# Patient Record
Sex: MALE
[De-identification: names, ages, dates, MRNs, and addresses within clinical notes are randomized per-mention and may not be internally consistent; named-entity substitution may affect disease eponyms.]

---

## 2017-08-14 ENCOUNTER — APPOINTMENT (OUTPATIENT)
Dept: INTERNAL MEDICINE | Facility: CLINIC | Age: 62
End: 2017-08-14
Payer: COMMERCIAL

## 2017-08-14 PROCEDURE — 93000 ELECTROCARDIOGRAM COMPLETE: CPT

## 2017-08-14 PROCEDURE — 99396 PREV VISIT EST AGE 40-64: CPT | Mod: 25

## 2017-10-30 ENCOUNTER — APPOINTMENT (OUTPATIENT)
Dept: INTERNAL MEDICINE | Facility: CLINIC | Age: 62
End: 2017-10-30
Payer: COMMERCIAL

## 2017-10-30 PROCEDURE — 99213 OFFICE O/P EST LOW 20 MIN: CPT

## 2019-03-25 ENCOUNTER — APPOINTMENT (OUTPATIENT)
Dept: INTERNAL MEDICINE | Facility: CLINIC | Age: 64
End: 2019-03-25
Payer: COMMERCIAL

## 2019-03-25 VITALS
OXYGEN SATURATION: 99 % | TEMPERATURE: 97.9 F | BODY MASS INDEX: 23.49 KG/M2 | HEIGHT: 68 IN | HEART RATE: 53 BPM | DIASTOLIC BLOOD PRESSURE: 78 MMHG | RESPIRATION RATE: 12 BRPM | SYSTOLIC BLOOD PRESSURE: 143 MMHG | WEIGHT: 155 LBS

## 2019-03-25 DIAGNOSIS — Z83.3 FAMILY HISTORY OF DIABETES MELLITUS: ICD-10-CM

## 2019-03-25 DIAGNOSIS — Z87.09 PERSONAL HISTORY OF OTHER DISEASES OF THE RESPIRATORY SYSTEM: ICD-10-CM

## 2019-03-25 DIAGNOSIS — Z78.9 OTHER SPECIFIED HEALTH STATUS: ICD-10-CM

## 2019-03-25 DIAGNOSIS — Z80.8 FAMILY HISTORY OF MALIGNANT NEOPLASM OF OTHER ORGANS OR SYSTEMS: ICD-10-CM

## 2019-03-25 DIAGNOSIS — Z91.89 OTHER SPECIFIED PERSONAL RISK FACTORS, NOT ELSEWHERE CLASSIFIED: ICD-10-CM

## 2019-03-25 DIAGNOSIS — Z87.19 PERSONAL HISTORY OF OTHER DISEASES OF THE DIGESTIVE SYSTEM: ICD-10-CM

## 2019-03-25 DIAGNOSIS — Z81.8 FAMILY HISTORY OF OTHER MENTAL AND BEHAVIORAL DISORDERS: ICD-10-CM

## 2019-03-25 DIAGNOSIS — Z86.39 PERSONAL HISTORY OF OTHER ENDOCRINE, NUTRITIONAL AND METABOLIC DISEASE: ICD-10-CM

## 2019-03-25 PROCEDURE — 99213 OFFICE O/P EST LOW 20 MIN: CPT

## 2019-03-25 NOTE — PHYSICAL EXAM
[de-identified] : MILDLY TENDER LEFT BREAST TISSSUE NO NODULES OR MASS FELT NO NIPPLE RETRACTION NL SKIN

## 2019-03-25 NOTE — HISTORY OF PRESENT ILLNESS
[FreeTextEntry8] : PT COMES WITH CC OF TENDER/SORE LEFT PEC AREA FOR A FEW WEEKS ,NO TRAUMA OR TRIGGER,AT TIMES WORSE IF HE TAKES A DEEP BREATH OR COUGHS

## 2019-03-25 NOTE — REVIEW OF SYSTEMS
[Mole Changes] : mole changes [Negative] : Heme/Lymph [FreeTextEntry9] : RIB CAGE PAIN [de-identified] : ? ENLARGED LEFT BREAST TISSUE

## 2019-03-25 NOTE — ASSESSMENT
[FreeTextEntry1] : ACUTE VISIT OF 63 Y OLD MALE WHO PRESENTS WITH LEFT RIB CAGE/LEFT BREAST AREA TENDERNESS FOR 1 M WITH NNO TRIGGER OR CAUSE= XR LEFT RIB CAGE AND MAMMOGRAM ORDERED= ;LABS ORDERED RTO FOR CPE

## 2019-04-17 ENCOUNTER — NON-APPOINTMENT (OUTPATIENT)
Age: 64
End: 2019-04-17

## 2019-04-17 ENCOUNTER — APPOINTMENT (OUTPATIENT)
Dept: INTERNAL MEDICINE | Facility: CLINIC | Age: 64
End: 2019-04-17
Payer: COMMERCIAL

## 2019-04-17 VITALS
DIASTOLIC BLOOD PRESSURE: 70 MMHG | HEART RATE: 59 BPM | SYSTOLIC BLOOD PRESSURE: 113 MMHG | TEMPERATURE: 97.8 F | WEIGHT: 165 LBS | OXYGEN SATURATION: 97 % | BODY MASS INDEX: 25.01 KG/M2 | RESPIRATION RATE: 12 BRPM | HEIGHT: 68 IN

## 2019-04-17 DIAGNOSIS — N62 HYPERTROPHY OF BREAST: ICD-10-CM

## 2019-04-17 PROCEDURE — 93000 ELECTROCARDIOGRAM COMPLETE: CPT

## 2019-04-17 PROCEDURE — 99396 PREV VISIT EST AGE 40-64: CPT | Mod: 25

## 2019-04-17 RX ORDER — MULTIVIT-MIN/FOLIC/VIT K/LYCOP 400-300MCG
50 MCG TABLET ORAL
Qty: 180 | Refills: 1 | Status: ACTIVE | COMMUNITY
Start: 2019-04-17 | End: 1900-01-01

## 2019-04-17 NOTE — PHYSICAL EXAM
[No Acute Distress] : no acute distress [Well Developed] : well developed [Well Nourished] : well nourished [Well-Appearing] : well-appearing [Normal Sclera/Conjunctiva] : normal sclera/conjunctiva [EOMI] : extraocular movements intact [PERRL] : pupils equal round and reactive to light [Normal Outer Ear/Nose] : the outer ears and nose were normal in appearance [Normal Oropharynx] : the oropharynx was normal [No JVD] : no jugular venous distention [Supple] : supple [No Lymphadenopathy] : no lymphadenopathy [Thyroid Normal, No Nodules] : the thyroid was normal and there were no nodules present [No Respiratory Distress] : no respiratory distress  [Clear to Auscultation] : lungs were clear to auscultation bilaterally [No Accessory Muscle Use] : no accessory muscle use [Normal Rate] : normal rate  [Regular Rhythm] : with a regular rhythm [Normal S1, S2] : normal S1 and S2 [No Carotid Bruits] : no carotid bruits [No Murmur] : no murmur heard [No Abdominal Bruit] : a ~M bruit was not heard ~T in the abdomen [No Varicosities] : no varicosities [Pedal Pulses Present] : the pedal pulses are present [No Palpable Aorta] : no palpable aorta [No Extremity Clubbing/Cyanosis] : no extremity clubbing/cyanosis [No Edema] : there was no peripheral edema [Soft] : abdomen soft [Non Tender] : non-tender [Non-distended] : non-distended [No HSM] : no HSM [No Masses] : no abdominal mass palpated [Normal Bowel Sounds] : normal bowel sounds [Normal Posterior Cervical Nodes] : no posterior cervical lymphadenopathy [Normal Anterior Cervical Nodes] : no anterior cervical lymphadenopathy [No CVA Tenderness] : no CVA  tenderness [No Spinal Tenderness] : no spinal tenderness [No Joint Swelling] : no joint swelling [Grossly Normal Strength/Tone] : grossly normal strength/tone [No Rash] : no rash [Normal Gait] : normal gait [Coordination Grossly Intact] : coordination grossly intact [No Focal Deficits] : no focal deficits [Deep Tendon Reflexes (DTR)] : deep tendon reflexes were 2+ and symmetric [Normal Affect] : the affect was normal [Normal Insight/Judgement] : insight and judgment were intact

## 2019-04-17 NOTE — ASSESSMENT
[FreeTextEntry1] : CPE OF 63 Y OLD MALE WITH PMX OF VIT D DEF= RESUME VIT D 2000 IU DAILY\par CHRISTINE GYNECOMASTIA= TO SEE ENDO FOR FURTHER RECOMM\par RTO IN 1 Y OR PRN

## 2020-01-29 ENCOUNTER — APPOINTMENT (OUTPATIENT)
Dept: INTERNAL MEDICINE | Facility: CLINIC | Age: 65
End: 2020-01-29
Payer: COMMERCIAL

## 2020-01-29 VITALS
BODY MASS INDEX: 24.25 KG/M2 | HEIGHT: 68 IN | HEART RATE: 58 BPM | RESPIRATION RATE: 14 BRPM | WEIGHT: 160 LBS | DIASTOLIC BLOOD PRESSURE: 77 MMHG | TEMPERATURE: 98.1 F | OXYGEN SATURATION: 98 % | SYSTOLIC BLOOD PRESSURE: 135 MMHG

## 2020-01-29 DIAGNOSIS — R26.81 UNSTEADINESS ON FEET: ICD-10-CM

## 2020-01-29 DIAGNOSIS — R42 DIZZINESS AND GIDDINESS: ICD-10-CM

## 2020-01-29 PROCEDURE — 99214 OFFICE O/P EST MOD 30 MIN: CPT

## 2020-01-29 NOTE — PHYSICAL EXAM
[No Acute Distress] : no acute distress [Well Nourished] : well nourished [Well Developed] : well developed [Well-Appearing] : well-appearing [Normal Sclera/Conjunctiva] : normal sclera/conjunctiva [PERRL] : pupils equal round and reactive to light [Normal Outer Ear/Nose] : the outer ears and nose were normal in appearance [EOMI] : extraocular movements intact [Normal Oropharynx] : the oropharynx was normal [No JVD] : no jugular venous distention [No Lymphadenopathy] : no lymphadenopathy [No Respiratory Distress] : no respiratory distress  [Supple] : supple [Thyroid Normal, No Nodules] : the thyroid was normal and there were no nodules present [No Accessory Muscle Use] : no accessory muscle use [Clear to Auscultation] : lungs were clear to auscultation bilaterally [Normal S1, S2] : normal S1 and S2 [Normal Rate] : normal rate  [Regular Rhythm] : with a regular rhythm [No Murmur] : no murmur heard [No Carotid Bruits] : no carotid bruits [No Abdominal Bruit] : a ~M bruit was not heard ~T in the abdomen [No Varicosities] : no varicosities [No Edema] : there was no peripheral edema [Pedal Pulses Present] : the pedal pulses are present [No Extremity Clubbing/Cyanosis] : no extremity clubbing/cyanosis [Soft] : abdomen soft [No Palpable Aorta] : no palpable aorta [Non Tender] : non-tender [Non-distended] : non-distended [No Masses] : no abdominal mass palpated [Normal Bowel Sounds] : normal bowel sounds [No HSM] : no HSM [Normal Posterior Cervical Nodes] : no posterior cervical lymphadenopathy [Normal Anterior Cervical Nodes] : no anterior cervical lymphadenopathy [No CVA Tenderness] : no CVA  tenderness [No Joint Swelling] : no joint swelling [Grossly Normal Strength/Tone] : grossly normal strength/tone [No Spinal Tenderness] : no spinal tenderness [No Rash] : no rash [Coordination Grossly Intact] : coordination grossly intact [No Focal Deficits] : no focal deficits [Deep Tendon Reflexes (DTR)] : deep tendon reflexes were 2+ and symmetric [Normal] : the sensory exam was normal [Limited Balance] : the patient's balance was impaired [Romberg's Sign] : Romberg's sign was negtive [Coordination - Dysmetria Impaired Finger-to-Nose Bilateral] : not present [Past-pointing] : there was no past-pointing [Dysdiadochokinesia Bilaterally] : not present [Normal Affect] : the affect was normal [Normal Insight/Judgement] : insight and judgment were intact

## 2020-01-29 NOTE — COUNSELING
[Fall prevention counseling provided] : Fall prevention counseling provided [Use recommended devices] : Use recommended devices [Use proper foot wear] : Use proper foot wear

## 2020-01-29 NOTE — ASSESSMENT
[FreeTextEntry1] : ACUTE VISIT OF 64 Y OLD MALE WITH VERTIGO AND PROGRESSIVE GAIT DISTURBANCE= LABS ORDERED,REFER TO SEE NEUROLOGY

## 2020-01-29 NOTE — HISTORY OF PRESENT ILLNESS
[FreeTextEntry8] : PT PRESENT WITH OVER 6 M OF GAIT DISTURBANCE EVERY DAY AND PROGRESSING ,USING A CANE TO AVOID FALLING ,HAS NOT FALLEN  BUT AFRAID OF DOING SO ,SPECIALLY WHEN CROSSING THE STREET\par LEFT FOOT PAIN AND FEELS LLE IS WEAKER THAN THE RIGHT \par HAS TO MOVE APARTMENT IN THE SUMMER AND FEELS TAHT FROM THERE ON SX GOT WORSE

## 2020-06-29 ENCOUNTER — APPOINTMENT (OUTPATIENT)
Dept: INTERNAL MEDICINE | Facility: CLINIC | Age: 65
End: 2020-06-29
Payer: COMMERCIAL

## 2020-06-29 VITALS
HEIGHT: 68 IN | WEIGHT: 159 LBS | HEART RATE: 63 BPM | BODY MASS INDEX: 24.1 KG/M2 | DIASTOLIC BLOOD PRESSURE: 75 MMHG | SYSTOLIC BLOOD PRESSURE: 144 MMHG | TEMPERATURE: 98.5 F | RESPIRATION RATE: 14 BRPM | OXYGEN SATURATION: 98 %

## 2020-06-29 DIAGNOSIS — G95.89 OTHER SPECIFIED DISEASES OF SPINAL CORD: ICD-10-CM

## 2020-06-29 DIAGNOSIS — Z01.818 ENCOUNTER FOR OTHER PREPROCEDURAL EXAMINATION: ICD-10-CM

## 2020-06-29 PROCEDURE — 99215 OFFICE O/P EST HI 40 MIN: CPT

## 2020-06-29 PROCEDURE — 93000 ELECTROCARDIOGRAM COMPLETE: CPT | Mod: NC

## 2020-06-29 NOTE — ASSESSMENT
[Patient Optimized for Surgery] : Patient optimized for surgery [Other: _____] : [unfilled] [As per surgery] : as per surgery [FreeTextEntry4] : 64 Y OLD MALE WITH MYELOMALACIA OF CERVICAL SPINAL CORD AT ACCEPTABLE RISK FOR SURGERY

## 2020-06-29 NOTE — REVIEW OF SYSTEMS
[Muscle Pain] : muscle pain [Muscle Weakness] : muscle weakness [Back Pain] : back pain [Unsteady Walk] : ataxia [Negative] : Heme/Lymph [de-identified] : PARESTHESIAS LEFT UPPER AND LOWER EXTR

## 2020-06-29 NOTE — HISTORY OF PRESENT ILLNESS
[No Pertinent Cardiac History] : no history of aortic stenosis, atrial fibrillation, coronary artery disease, recent myocardial infarction, or implantable device/pacemaker [No Pertinent Pulmonary History] : no history of asthma, COPD, sleep apnea, or smoking [No Adverse Anesthesia Reaction] : no adverse anesthesia reaction in self or family member [Chronic Kidney Disease] : no chronic kidney disease [Chronic Anticoagulation] : no chronic anticoagulation [FreeTextEntry1] : C4-7 LAMINECTOMY [Diabetes] : no diabetes [FreeTextEntry2] : 7/6/2020 [FreeTextEntry3] : DR OMAYRA NOYOLA

## 2020-06-29 NOTE — PHYSICAL EXAM
[Well Nourished] : well nourished [Well Developed] : well developed [No Acute Distress] : no acute distress [Well-Appearing] : well-appearing [Normal Sclera/Conjunctiva] : normal sclera/conjunctiva [PERRL] : pupils equal round and reactive to light [EOMI] : extraocular movements intact [No Lymphadenopathy] : no lymphadenopathy [No JVD] : no jugular venous distention [Thyroid Normal, No Nodules] : the thyroid was normal and there were no nodules present [Supple] : supple [No Respiratory Distress] : no respiratory distress  [Clear to Auscultation] : lungs were clear to auscultation bilaterally [No Accessory Muscle Use] : no accessory muscle use [Regular Rhythm] : with a regular rhythm [Normal Rate] : normal rate  [Normal S1, S2] : normal S1 and S2 [No Murmur] : no murmur heard [No Carotid Bruits] : no carotid bruits [No Varicosities] : no varicosities [No Abdominal Bruit] : a ~M bruit was not heard ~T in the abdomen [No Edema] : there was no peripheral edema [Pedal Pulses Present] : the pedal pulses are present [No Extremity Clubbing/Cyanosis] : no extremity clubbing/cyanosis [No Palpable Aorta] : no palpable aorta [Soft] : abdomen soft [Non Tender] : non-tender [No Masses] : no abdominal mass palpated [Non-distended] : non-distended [Normal Bowel Sounds] : normal bowel sounds [No HSM] : no HSM [Normal Posterior Cervical Nodes] : no posterior cervical lymphadenopathy [Normal Anterior Cervical Nodes] : no anterior cervical lymphadenopathy [No Spinal Tenderness] : no spinal tenderness [No CVA Tenderness] : no CVA  tenderness [No Joint Swelling] : no joint swelling [No Rash] : no rash [Grossly Normal Strength/Tone] : grossly normal strength/tone [Coordination Grossly Intact] : coordination grossly intact [No Focal Deficits] : no focal deficits [Deep Tendon Reflexes (DTR)] : deep tendon reflexes were 2+ and symmetric [Normal Affect] : the affect was normal [Normal Insight/Judgement] : insight and judgment were intact [de-identified] : MILD LUE WEAKNESS [de-identified] : WALSK WITH CANE

## 2020-09-16 ENCOUNTER — APPOINTMENT (OUTPATIENT)
Dept: INTERNAL MEDICINE | Facility: CLINIC | Age: 65
End: 2020-09-16
Payer: COMMERCIAL

## 2020-09-16 VITALS
WEIGHT: 162 LBS | BODY MASS INDEX: 24.55 KG/M2 | HEART RATE: 55 BPM | RESPIRATION RATE: 14 BRPM | HEIGHT: 68 IN | TEMPERATURE: 96.9 F | OXYGEN SATURATION: 99 % | DIASTOLIC BLOOD PRESSURE: 75 MMHG | SYSTOLIC BLOOD PRESSURE: 122 MMHG

## 2020-09-16 PROCEDURE — 99214 OFFICE O/P EST MOD 30 MIN: CPT

## 2020-09-16 NOTE — HISTORY OF PRESENT ILLNESS
[FreeTextEntry8] : PT WALKS INTO OFFICE WITH  CC OF TENDER BULGING ON R INGUINAL AREA  FOR SEVERAL DAYS,WORSE AFTER AN EPISODE OF CONSTIPATION\par ALSO CC OF EPIGASTRIC POSTPRANDIAL DYSPEPSIA FOR A FEW DAYS \par NO  SX NORMAL APPETITE ,NO FEVER ,NAUSEA OR ANY OTHER SX

## 2020-09-16 NOTE — PHYSICAL EXAM
[No Respiratory Distress] : no respiratory distress  [Soft, Nontender] : the abdomen was soft and nontender [Normal Rate] : normal rate  [Normal] : normal [Periumbilical] : not periumbilical [Epigastric] : in the epigastric area [Suprapubic] : not in the suprapubic area [Firm] : not firm [Rigid] : not rigid [Rebound] : no rebound [Guarding] : no guarding [No Mass] : no masses were palpated [Inguinal Hernia Right] : a right inguinal hernia was present [] : which was reducible [No HSM] : no hepatosplenomegaly noted [None] : no CVA tenderness

## 2020-09-16 NOTE — ASSESSMENT
[FreeTextEntry1] : ACUTE VISIT OF 65 Y OLD MALE WHO PRESENTS WITH NEW ONSET OF DYSPEPSIA -HEARTBURN FOR A FEW DAYS= OMEPRAZOLE 20 MG DAILY\par NEWLY DX R INGUINAL HERNIA=,SMALL AND REDUCIBLE= TO SEE SURGEON ;EXPLAIN NEED TO GO TO ER IF STEADY PAIN OR ANY OTHER GI SX DEVELOPS

## 2020-09-16 NOTE — REVIEW OF SYSTEMS
[Abdominal Pain] : abdominal pain [Negative] : Psychiatric [FreeTextEntry7] : SEE HPI [FreeTextEntry8] : SEE HPI

## 2020-09-25 ENCOUNTER — NON-APPOINTMENT (OUTPATIENT)
Age: 65
End: 2020-09-25

## 2020-09-25 ENCOUNTER — APPOINTMENT (OUTPATIENT)
Dept: INTERNAL MEDICINE | Facility: CLINIC | Age: 65
End: 2020-09-25
Payer: COMMERCIAL

## 2020-09-25 VITALS
HEIGHT: 68 IN | HEART RATE: 57 BPM | WEIGHT: 161 LBS | OXYGEN SATURATION: 98 % | DIASTOLIC BLOOD PRESSURE: 81 MMHG | BODY MASS INDEX: 24.4 KG/M2 | RESPIRATION RATE: 15 BRPM | TEMPERATURE: 97.1 F | SYSTOLIC BLOOD PRESSURE: 136 MMHG

## 2020-09-25 DIAGNOSIS — R25.1 TREMOR, UNSPECIFIED: ICD-10-CM

## 2020-09-25 DIAGNOSIS — R00.2 PALPITATIONS: ICD-10-CM

## 2020-09-25 PROCEDURE — 93000 ELECTROCARDIOGRAM COMPLETE: CPT

## 2020-09-25 PROCEDURE — 99214 OFFICE O/P EST MOD 30 MIN: CPT

## 2020-09-25 NOTE — PHYSICAL EXAM

## 2020-09-25 NOTE — HISTORY OF PRESENT ILLNESS
[FreeTextEntry8] : PT DEVELOPED LEFT ARM TREMORS FOLLOWED BY DIFFUSE TREMORS 4 NIGHTS AGO ,WHEN WIFE MEASURE HIS BP /130 AND PULSE OVER 130,AFTER 20 MIN BP AND PULSE RETURN TO NORMAL ;THE FOLLOWING NIGHT HAD SIMILAR EPISODE BUT SHORTER AND LESS INTENSE AND DID NOT MEASURE HIS PULSE OR BP\par LAST 2 NIGHTS NO SX \par NO SX DURING DAYTIME\par

## 2020-09-25 NOTE — ASSESSMENT
[FreeTextEntry1] : ACUTE VISIT OF 65 Y OLD MALE WHO PRESENTS WITH 2 EPISODES OF PALPITATIONS AND ELEVATED BP READING ALONG WITH UPPER EXTR TREMORS AT NIGHT ,SELF RESOLVED= EKG ,ECHO AND CARDIOLOGY EVAL\par R ING HERNIA = MAY PROCEED TO SURGERY AFTER CARDIOLOGY EVAL

## 2020-10-01 ENCOUNTER — APPOINTMENT (OUTPATIENT)
Dept: HEART AND VASCULAR | Facility: CLINIC | Age: 65
End: 2020-10-01
Payer: COMMERCIAL

## 2020-10-01 VITALS
HEART RATE: 65 BPM | SYSTOLIC BLOOD PRESSURE: 118 MMHG | TEMPERATURE: 97.2 F | HEIGHT: 68 IN | RESPIRATION RATE: 14 BRPM | WEIGHT: 161 LBS | BODY MASS INDEX: 24.4 KG/M2 | OXYGEN SATURATION: 99 % | DIASTOLIC BLOOD PRESSURE: 56 MMHG

## 2020-10-01 PROCEDURE — 99215 OFFICE O/P EST HI 40 MIN: CPT

## 2020-10-01 NOTE — ASSESSMENT
[FreeTextEntry1] : 66 yo M with PMH cervical stenosis here for first evaluation with this provider for pre op evaluation prior to hernia repair surgery\par \par PRE OP EVALUATION\par -The patient has good exercise tolerance without any angina\par -Baseline EKG is significant for sinus bradycardia, which is she is unchanged and in the last months, without evidence of ischemia\par -Baseline echocardiogram is within normal limits with a normal EF\par -The patient doesn't have a cardiac history and has minimal risk factors\par -In view of the above the patient can proceed to treat hernia repair surgery without the need of any additional cardiological workup\par -Recommended to maintain patient euvolemic normotensive during the procedure\par \par SINUS BRADYCARDIA\par -The patient has asymptomatic sinus bradycardia, normal echocardiogram and good exercise tolerance\par -In the setting of a lack of symptoms, recommend to continue to monitor without any further work up\par -Recommended to call clinic if onset of symptoms, we'll assess for chronotropic competence with a stress test\par \par HTN\par -Well controlled on no medications\par -Continue with healthy diet and exercise\par \par HLD\par -lipid panel 2/3/2020, cholesterol 201, LDL 96, HDL 88 on no medications\par \par \par f/u with PMD \par please do not hesitate to call if any questions

## 2020-10-01 NOTE — HISTORY OF PRESENT ILLNESS
[FreeTextEntry1] : 66 yo M with PMH cervical stenosis here for first evaluation with this provider \par  The patient is here for preoperative clearance prior to hernia repair.\par \par The patient reports being able to walk outdoor with a cane for more than 8 blocks without chest pain, sob, palpitations.  Reports episodes of pinpoint reproducible left rib cage pain which has been present for many years. (negative CXR in june 2020_ \par Denies h/o CVA. He is not taking any medications. \par Had a laminoplasty for cervical stenosis in July 2020 which was uneventful. \par \par \par PMH\par GERD \par asthma\par cervical stenosis\par \par PSH\par none\par \par ALL\par ibuprofen\par \par MEDS\par Vit D\par \par SH\par no smoke, social etoh, no drugs\par \par EKG 9/25/2020\par sinus bradycardia HR 53 (unchanged from 6/2020)\par \par Echocardiogram 9/28/2020\par Normal left ventricular size and function with an ejection fraction of 55-60%\par \par Labs 6/2020 \par creatinine 1.0\par

## 2020-10-01 NOTE — PHYSICAL EXAM
[FreeTextEntry1] : thin man,in nad  [Normal Jugular Venous A Waves Present] : normal jugular venous A waves present [Normal Jugular Venous V Waves Present] : normal jugular venous V waves present [No Jugular Venous Massey A Waves] : no jugular venous massey A waves [Heart Rate And Rhythm] : heart rate and rhythm were normal [Heart Sounds] : normal S1 and S2 [Murmurs] : no murmurs present [Respiration, Rhythm And Depth] : normal respiratory rhythm and effort [Exaggerated Use Of Accessory Muscles For Inspiration] : no accessory muscle use [Auscultation Breath Sounds / Voice Sounds] : lungs were clear to auscultation bilaterally [Abdomen Soft] : soft [Abdomen Tenderness] : non-tender [Abdomen Mass (___ Cm)] : no abdominal mass palpated [Nail Clubbing] : no clubbing of the fingernails [Cyanosis, Localized] : no localized cyanosis [Petechial Hemorrhages (___cm)] : no petechial hemorrhages [] : no ischemic changes

## 2020-10-05 ENCOUNTER — APPOINTMENT (OUTPATIENT)
Dept: INTERNAL MEDICINE | Facility: CLINIC | Age: 65
End: 2020-10-05
Payer: COMMERCIAL

## 2020-10-05 VITALS
WEIGHT: 162 LBS | TEMPERATURE: 98.9 F | DIASTOLIC BLOOD PRESSURE: 80 MMHG | RESPIRATION RATE: 14 BRPM | SYSTOLIC BLOOD PRESSURE: 136 MMHG | OXYGEN SATURATION: 95 % | HEIGHT: 68 IN | HEART RATE: 69 BPM | BODY MASS INDEX: 24.55 KG/M2

## 2020-10-05 DIAGNOSIS — M47.12 OTHER SPONDYLOSIS WITH MYELOPATHY, CERVICAL REGION: ICD-10-CM

## 2020-10-05 DIAGNOSIS — R07.89 OTHER CHEST PAIN: ICD-10-CM

## 2020-10-05 DIAGNOSIS — K21.9 GASTRO-ESOPHAGEAL REFLUX DISEASE W/OUT ESOPHAGITIS: ICD-10-CM

## 2020-10-05 DIAGNOSIS — M47.22 OTHER SPONDYLOSIS WITH MYELOPATHY, CERVICAL REGION: ICD-10-CM

## 2020-10-05 PROCEDURE — 99214 OFFICE O/P EST MOD 30 MIN: CPT

## 2020-10-05 NOTE — HISTORY OF PRESENT ILLNESS
[FreeTextEntry1] : CHEST PAIN [de-identified] : PT STILL WITH SPORADIC LEFT SIDE CHEST PAIN MID AXILLARY LINE ,WORSE WHEN TURNS IN BED ,NOT ASSOCIATED WITH ACTIVITY ,NO COUGH NO SOB ,NO TENDERNESS TO TOUCH \par SEEN BY CARDIOLOGY FEW DAYS AGO WITH NORMAL W/U\par PT UNDERGOING PT AFTER C SPINE SURGERY\par

## 2020-10-05 NOTE — ASSESSMENT
[FreeTextEntry1] : 65 Y OLD MALE WITH ATYPICAL CHEST PAIN ,MILD= OBSERVE\par GERD= ASYMPTOMATIC ,TAPER OMEPRAZOLE TO OFF AS TOLERATED \par S/P C SPINE SURGERY= ON PT \par R INGUINAL HERNIA= PT WILL WAIT TO HAVE SURGERY FOR NOW\par ABLE TO RETURN TO WORK 10-7-2020

## 2020-10-05 NOTE — PHYSICAL EXAM
[No Acute Distress] : no acute distress [Well Nourished] : well nourished [Well Developed] : well developed [Well-Appearing] : well-appearing [Normal Sclera/Conjunctiva] : normal sclera/conjunctiva [PERRL] : pupils equal round and reactive to light [EOMI] : extraocular movements intact [No JVD] : no jugular venous distention [No Lymphadenopathy] : no lymphadenopathy [Supple] : supple [Thyroid Normal, No Nodules] : the thyroid was normal and there were no nodules present [No Respiratory Distress] : no respiratory distress  [No Accessory Muscle Use] : no accessory muscle use [Clear to Auscultation] : lungs were clear to auscultation bilaterally [Normal Rate] : normal rate  [Regular Rhythm] : with a regular rhythm [Normal S1, S2] : normal S1 and S2 [No Murmur] : no murmur heard [No Carotid Bruits] : no carotid bruits [No Abdominal Bruit] : a ~M bruit was not heard ~T in the abdomen [No Varicosities] : no varicosities [Pedal Pulses Present] : the pedal pulses are present [No Edema] : there was no peripheral edema [No Palpable Aorta] : no palpable aorta [No Extremity Clubbing/Cyanosis] : no extremity clubbing/cyanosis [Soft] : abdomen soft [Non Tender] : non-tender [Non-distended] : non-distended [No Masses] : no abdominal mass palpated [No HSM] : no HSM [Normal Bowel Sounds] : normal bowel sounds [Normal Posterior Cervical Nodes] : no posterior cervical lymphadenopathy [Normal Anterior Cervical Nodes] : no anterior cervical lymphadenopathy [No Joint Swelling] : no joint swelling [Grossly Normal Strength/Tone] : grossly normal strength/tone [No Rash] : no rash [Coordination Grossly Intact] : coordination grossly intact [No Focal Deficits] : no focal deficits [Normal Gait] : normal gait [Normal Affect] : the affect was normal [Normal Insight/Judgement] : insight and judgment were intact

## 2020-10-30 ENCOUNTER — APPOINTMENT (OUTPATIENT)
Dept: CARDIOLOGY | Facility: CLINIC | Age: 65
End: 2020-10-30

## 2022-01-03 ENCOUNTER — APPOINTMENT (OUTPATIENT)
Dept: INTERNAL MEDICINE | Facility: CLINIC | Age: 67
End: 2022-01-03
Payer: COMMERCIAL

## 2022-01-03 ENCOUNTER — NON-APPOINTMENT (OUTPATIENT)
Age: 67
End: 2022-01-03

## 2022-01-03 VITALS
RESPIRATION RATE: 14 BRPM | DIASTOLIC BLOOD PRESSURE: 86 MMHG | TEMPERATURE: 98.2 F | SYSTOLIC BLOOD PRESSURE: 146 MMHG | HEART RATE: 70 BPM | BODY MASS INDEX: 24.86 KG/M2 | HEIGHT: 68 IN | WEIGHT: 164 LBS

## 2022-01-03 DIAGNOSIS — Z00.00 ENCOUNTER FOR GENERAL ADULT MEDICAL EXAMINATION W/OUT ABNORMAL FINDINGS: ICD-10-CM

## 2022-01-03 DIAGNOSIS — Z12.11 ENCOUNTER FOR SCREENING FOR MALIGNANT NEOPLASM OF COLON: ICD-10-CM

## 2022-01-03 PROCEDURE — 93000 ELECTROCARDIOGRAM COMPLETE: CPT

## 2022-01-03 PROCEDURE — 99396 PREV VISIT EST AGE 40-64: CPT

## 2022-01-03 NOTE — ASSESSMENT
[FreeTextEntry1] : CPE OF 66 Y OLD MALE WITH PMX OF R ING HERNIA= LABS AND EKG \par RECOMM GI AND OPHTHA EVAL \par RTO 6 M \par RECOMM COVID-19 3ER DOSE

## 2022-01-03 NOTE — PHYSICAL EXAM
[No Acute Distress] : no acute distress [Well Nourished] : well nourished [Well Developed] : well developed [Well-Appearing] : well-appearing [Normal Sclera/Conjunctiva] : normal sclera/conjunctiva [PERRL] : pupils equal round and reactive to light [EOMI] : extraocular movements intact [No JVD] : no jugular venous distention [No Lymphadenopathy] : no lymphadenopathy [Supple] : supple [Thyroid Normal, No Nodules] : the thyroid was normal and there were no nodules present [No Respiratory Distress] : no respiratory distress  [No Accessory Muscle Use] : no accessory muscle use [Clear to Auscultation] : lungs were clear to auscultation bilaterally [Normal Rate] : normal rate  [Regular Rhythm] : with a regular rhythm [Normal S1, S2] : normal S1 and S2 [No Murmur] : no murmur heard [No Carotid Bruits] : no carotid bruits [No Abdominal Bruit] : a ~M bruit was not heard ~T in the abdomen [No Varicosities] : no varicosities [Pedal Pulses Present] : the pedal pulses are present [No Edema] : there was no peripheral edema [No Palpable Aorta] : no palpable aorta [No Extremity Clubbing/Cyanosis] : no extremity clubbing/cyanosis [Soft] : abdomen soft [Non Tender] : non-tender [Non-distended] : non-distended [No Masses] : no abdominal mass palpated [No HSM] : no HSM [Normal Bowel Sounds] : normal bowel sounds [Inguinal Hernia Right] : a right inguinal hernia was present [] : which was reducible [Normal Posterior Cervical Nodes] : no posterior cervical lymphadenopathy [Normal Anterior Cervical Nodes] : no anterior cervical lymphadenopathy [No CVA Tenderness] : no CVA  tenderness [No Spinal Tenderness] : no spinal tenderness [No Joint Swelling] : no joint swelling [Grossly Normal Strength/Tone] : grossly normal strength/tone [No Rash] : no rash [Coordination Grossly Intact] : coordination grossly intact [No Focal Deficits] : no focal deficits [Normal Affect] : the affect was normal [Normal Insight/Judgement] : insight and judgment were intact

## 2022-01-03 NOTE — HISTORY OF PRESENT ILLNESS
[de-identified] : PT COMES FOR CPE \par R ING HERNIA UNCHANGED\par HAD 2ND COVID-19 VACCINE 7/21\par LAST COLONOSCOPY 9 Y AGO ;SOME MILD INCREASE IN CONSTIPATION \par BIKES REGULARLY \par LAST OPHTHALMOLOGY 2 Y AGO

## 2022-08-29 ENCOUNTER — APPOINTMENT (OUTPATIENT)
Dept: INTERNAL MEDICINE | Facility: CLINIC | Age: 67
End: 2022-08-29

## 2022-08-29 VITALS
OXYGEN SATURATION: 97 % | BODY MASS INDEX: 23.49 KG/M2 | RESPIRATION RATE: 14 BRPM | SYSTOLIC BLOOD PRESSURE: 130 MMHG | HEIGHT: 68 IN | HEART RATE: 65 BPM | TEMPERATURE: 97.3 F | WEIGHT: 155 LBS | DIASTOLIC BLOOD PRESSURE: 74 MMHG

## 2022-08-29 DIAGNOSIS — U07.1 COVID-19: ICD-10-CM

## 2022-08-29 PROCEDURE — 99214 OFFICE O/P EST MOD 30 MIN: CPT

## 2022-08-29 NOTE — PHYSICAL EXAM
[Soft, Nontender] : the abdomen was soft and nontender [No Mass] : no masses were palpated [No HSM] : no hepatosplenomegaly noted [Inguinal Hernia Right] : a right inguinal hernia was present [] : which was reducible [Normal] : affect was normal and insight and judgment were intact

## 2022-08-29 NOTE — ASSESSMENT
[FreeTextEntry1] : 67 Y OLD MALE S/P COVID-19 MILD INF 4 WEEKS AGO = OBSERVE ,RECOMM 4TH VACCINE \par R ING HERNIA= F/U SURGEON \par MILD CONSTIPATION = INCREASE FRUITS AND VEG \par RTO PREOP OR PRN

## 2022-08-29 NOTE — HISTORY OF PRESENT ILLNESS
[de-identified] : PT HAD COVID-19 INF 4 WEEKS AGO ;3ER COVID-19 VACCINE OVER 6 M PRIOR TO ILLNESS \par SX RESOLVED BUT AFTER COUGH FEELS THAT R ING HERNIA IS MORE SENSITIVE AND HAVING MILD CONSTIPATION AND BLOATING ;HAD NL COLONOSCOPY FEW MONTHS AGO BUT DID NOT SEEK SURGICAL RENEE AS RECOMM LAST VISIT

## 2022-08-29 NOTE — REVIEW OF SYSTEMS
[Cough] : cough [Abdominal Pain] : abdominal pain [Constipation] : constipation [Negative] : Heme/Lymph

## 2022-09-22 ENCOUNTER — NON-APPOINTMENT (OUTPATIENT)
Age: 67
End: 2022-09-22

## 2022-09-26 ENCOUNTER — APPOINTMENT (OUTPATIENT)
Dept: INTERNAL MEDICINE | Facility: CLINIC | Age: 67
End: 2022-09-26

## 2022-09-26 ENCOUNTER — NON-APPOINTMENT (OUTPATIENT)
Age: 67
End: 2022-09-26

## 2022-09-26 VITALS
HEIGHT: 68 IN | DIASTOLIC BLOOD PRESSURE: 67 MMHG | OXYGEN SATURATION: 96 % | SYSTOLIC BLOOD PRESSURE: 120 MMHG | BODY MASS INDEX: 23.49 KG/M2 | WEIGHT: 155 LBS | TEMPERATURE: 97.1 F | RESPIRATION RATE: 14 BRPM | HEART RATE: 64 BPM

## 2022-09-26 DIAGNOSIS — K40.90 UNILATERAL INGUINAL HERNIA, W/OUT OBSTRUCTION OR GANGRENE, NOT SPECIFIED AS RECURRENT: ICD-10-CM

## 2022-09-26 PROCEDURE — 99214 OFFICE O/P EST MOD 30 MIN: CPT | Mod: 25

## 2022-09-26 PROCEDURE — 93000 ELECTROCARDIOGRAM COMPLETE: CPT

## 2022-09-26 RX ORDER — OMEPRAZOLE 20 MG/1
20 CAPSULE, DELAYED RELEASE ORAL
Qty: 15 | Refills: 0 | Status: DISCONTINUED | COMMUNITY
Start: 2020-09-16 | End: 2022-09-26

## 2022-09-26 NOTE — ASSESSMENT
[Patient Optimized for Surgery] : Patient optimized for surgery [No Further Testing Recommended] : no further testing recommended [Continue medications as is] : Continue current medications [As per surgery] : as per surgery [FreeTextEntry4] : 67 Y OLD MALE WITH PMX OF R ING HERNIA AT ACCEPTABLE RISK FOR SURGERY \par BIVALENT COVID-19  AND INFLUENZA VACCINE  RECOMM

## 2022-09-26 NOTE — HISTORY OF PRESENT ILLNESS
[No Pertinent Cardiac History] : no history of aortic stenosis, atrial fibrillation, coronary artery disease, recent myocardial infarction, or implantable device/pacemaker [No Pertinent Pulmonary History] : no history of asthma, COPD, sleep apnea, or smoking [No Adverse Anesthesia Reaction] : no adverse anesthesia reaction in self or family member [Chronic Anticoagulation] : no chronic anticoagulation [Chronic Kidney Disease] : no chronic kidney disease [Diabetes] : no diabetes [FreeTextEntry1] : R ING HERNIA REPAIR  [FreeTextEntry2] : 10/4/22 [FreeTextEntry4] : PT WILL UNDERGO R ING HERNIA SURGERY 10/4/22,EXCEPT FOR SPORADIC ABD CRAMPING AND R ING TENDERNESS HE FEELS FINE ;NL BM AND APPETITE

## 2024-10-11 ENCOUNTER — NON-APPOINTMENT (OUTPATIENT)
Age: 69
End: 2024-10-11

## 2024-10-11 ENCOUNTER — APPOINTMENT (OUTPATIENT)
Dept: INTERNAL MEDICINE | Facility: CLINIC | Age: 69
End: 2024-10-11
Payer: COMMERCIAL

## 2024-10-11 VITALS
SYSTOLIC BLOOD PRESSURE: 135 MMHG | BODY MASS INDEX: 24.25 KG/M2 | HEIGHT: 68 IN | OXYGEN SATURATION: 96 % | HEART RATE: 79 BPM | DIASTOLIC BLOOD PRESSURE: 78 MMHG | WEIGHT: 160 LBS | RESPIRATION RATE: 12 BRPM

## 2024-10-11 DIAGNOSIS — R26.81 UNSTEADINESS ON FEET: ICD-10-CM

## 2024-10-11 DIAGNOSIS — R42 DIZZINESS AND GIDDINESS: ICD-10-CM

## 2024-10-11 DIAGNOSIS — Z00.00 ENCOUNTER FOR GENERAL ADULT MEDICAL EXAMINATION W/OUT ABNORMAL FINDINGS: ICD-10-CM

## 2024-10-11 DIAGNOSIS — M65.312 TRIGGER THUMB, LEFT THUMB: ICD-10-CM

## 2024-10-11 PROCEDURE — 99397 PER PM REEVAL EST PAT 65+ YR: CPT

## 2024-10-11 PROCEDURE — 99213 OFFICE O/P EST LOW 20 MIN: CPT | Mod: 25

## 2024-10-11 PROCEDURE — 93000 ELECTROCARDIOGRAM COMPLETE: CPT
